# Patient Record
Sex: FEMALE | Race: WHITE | NOT HISPANIC OR LATINO
[De-identification: names, ages, dates, MRNs, and addresses within clinical notes are randomized per-mention and may not be internally consistent; named-entity substitution may affect disease eponyms.]

---

## 2019-01-29 ENCOUNTER — RECORD ABSTRACTING (OUTPATIENT)
Age: 69
End: 2019-01-29

## 2019-01-29 DIAGNOSIS — M54.9 DORSALGIA, UNSPECIFIED: ICD-10-CM

## 2019-01-29 DIAGNOSIS — Z80.0 FAMILY HISTORY OF MALIGNANT NEOPLASM OF DIGESTIVE ORGANS: ICD-10-CM

## 2019-02-05 ENCOUNTER — APPOINTMENT (OUTPATIENT)
Dept: GASTROENTEROLOGY | Facility: CLINIC | Age: 69
End: 2019-02-05
Payer: MEDICARE

## 2019-02-05 VITALS
WEIGHT: 138 LBS | BODY MASS INDEX: 26.06 KG/M2 | DIASTOLIC BLOOD PRESSURE: 81 MMHG | HEIGHT: 61 IN | SYSTOLIC BLOOD PRESSURE: 138 MMHG

## 2019-02-05 DIAGNOSIS — R13.10 DYSPHAGIA, UNSPECIFIED: ICD-10-CM

## 2019-02-05 PROCEDURE — 99214 OFFICE O/P EST MOD 30 MIN: CPT

## 2019-02-05 RX ORDER — MULTIVIT-MIN/IRON/FOLIC ACID/K 18-600-40
500 CAPSULE ORAL
Refills: 0 | Status: DISCONTINUED | COMMUNITY
End: 2019-02-05

## 2019-02-05 RX ORDER — MULTIVITAMIN
CAPSULE ORAL
Refills: 0 | Status: DISCONTINUED | COMMUNITY
End: 2019-02-05

## 2019-02-05 NOTE — HISTORY OF PRESENT ILLNESS
[FreeTextEntry1] : LPR; patient referred for GI evaluation..by Dr Saldivar, who has been evaluating and treating the patient for pharyngeal symptoms suspected of being Laryngo Pharyngeal Reflux  [Silent Reflux]\par \par the symptoms are as follows;\par for the last five or more years, pharyngeal discomfort, not related to any particular food, but it can occur with exercise\par she had a stress test and cardiology evaluation with dr Driver...who had performed a stress test perhaps a year ago.\par \par I checked the dates; and she had a stress test in late 2016.\par \par the throat discomfort is sometimes in reports described as burning, but the patient does not describe it that way.\par \par the pain is almost always if not always in assoc with exercise, predictably when she plays tennis.\par \par i\par

## 2019-02-05 NOTE — ASSESSMENT
[FreeTextEntry1] : 1.  patient has recurrent pharyngeal symptoms, and on my history, they are related generally to exertion, such as playing tennis, and they are relieved with rest.\par 2.  dr Saldivar has suspected silent reflux, and has prescribed ranitidine in the past, and then pantoprazole, neither of which have particularly helped\par 3.  My concern is the exertional component, and for that reason, I have just called and spoken to Dr Driver, and patient will be seen and evaluated for another work up for coronary disease.\par 4.  I would hold off on the EGD for now...patient sometimes has some difficulty swallowing certain foods out of her throat..but this seems to be different from the pain...\par 5.  therefore, after cardiac work up is completed, we can most definitely proceed to endoscopy as the next step

## 2019-02-13 ENCOUNTER — APPOINTMENT (OUTPATIENT)
Dept: CARDIOLOGY | Facility: CLINIC | Age: 69
End: 2019-02-13
Payer: MEDICARE

## 2019-02-13 VITALS
HEART RATE: 68 BPM | WEIGHT: 131 LBS | HEIGHT: 61 IN | BODY MASS INDEX: 24.73 KG/M2 | SYSTOLIC BLOOD PRESSURE: 120 MMHG | DIASTOLIC BLOOD PRESSURE: 70 MMHG

## 2019-02-13 DIAGNOSIS — M17.0 BILATERAL PRIMARY OSTEOARTHRITIS OF KNEE: ICD-10-CM

## 2019-02-13 PROCEDURE — 93015 CV STRESS TEST SUPVJ I&R: CPT

## 2019-02-13 PROCEDURE — 99213 OFFICE O/P EST LOW 20 MIN: CPT | Mod: 25

## 2019-02-13 NOTE — HISTORY OF PRESENT ILLNESS
[FreeTextEntry1] : I have evaluated the patient previously with similar symptoms which did not appear to be of cardiac origin she did well on previous stress test.

## 2019-02-13 NOTE — DISCUSSION/SUMMARY
[FreeTextEntry1] : The patient comes for evaluation of her perceived a symptom of shortness of breath on exertion. She was able to engage in regular exercise without difficulty. I had previously evaluated this a number of years ago and had no evidence of a cardiac etiology. I performed a treadmill stress test today revealing no evidence of exercise-induced myocardial ischemia or arrhythmias and fairly good exercise capacity. I reviewed the patient's labs. The salient findings are slightly elevated TSH and the patient has had a borderline glycated hemoglobin in the past. I advised her to avoid concentrated sweets refined carbohydrates et cetera. Evidently her diet could use some improvement. With regard to the shortness of breath I again do not feel this is of a cardiac etiology. I asked her to follow up with her primary care physician. Possibly some form of mild exertional asthma may be present.

## 2019-02-13 NOTE — REASON FOR VISIT
[FreeTextEntry1] : Patient complains of symptoms of shortness of breath on exertion. This is a perceived symptom. She was able to exercise vigorously with no particular difficulty. There has been no anginal discomfort.

## 2019-02-13 NOTE — PHYSICAL EXAM
[General Appearance - Well Developed] : well developed [Normal Appearance] : normal appearance [Well Groomed] : well groomed [General Appearance - Well Nourished] : well nourished [No Deformities] : no deformities [General Appearance - In No Acute Distress] : no acute distress [Normal Conjunctiva] : the conjunctiva exhibited no abnormalities [Eyelids - No Xanthelasma] : the eyelids demonstrated no xanthelasmas [Normal Oral Mucosa] : normal oral mucosa [No Oral Pallor] : no oral pallor [No Oral Cyanosis] : no oral cyanosis [Normal Jugular Venous A Waves Present] : normal jugular venous A waves present [Normal Jugular Venous V Waves Present] : normal jugular venous V waves present [No Jugular Venous Fournier A Waves] : no jugular venous fournier A waves [Respiration, Rhythm And Depth] : normal respiratory rhythm and effort [Exaggerated Use Of Accessory Muscles For Inspiration] : no accessory muscle use [Auscultation Breath Sounds / Voice Sounds] : lungs were clear to auscultation bilaterally [Heart Rate And Rhythm] : heart rate and rhythm were normal [Heart Sounds] : normal S1 and S2 [Murmurs] : no murmurs present [Abdomen Soft] : soft [Abdomen Tenderness] : non-tender [Abdomen Mass (___ Cm)] : no abdominal mass palpated [Abnormal Walk] : normal gait [Gait - Sufficient For Exercise Testing] : the gait was sufficient for exercise testing [Nail Clubbing] : no clubbing of the fingernails [Cyanosis, Localized] : no localized cyanosis [Petechial Hemorrhages (___cm)] : no petechial hemorrhages [Skin Color & Pigmentation] : normal skin color and pigmentation [] : no rash [No Venous Stasis] : no venous stasis [Skin Lesions] : no skin lesions [No Skin Ulcers] : no skin ulcer [No Xanthoma] : no  xanthoma was observed [Oriented To Time, Place, And Person] : oriented to person, place, and time [Affect] : the affect was normal [Mood] : the mood was normal [No Anxiety] : not feeling anxious [FreeTextEntry1] : The patient is experiencing some symptoms of dyspnea on exertion.

## 2019-07-31 ENCOUNTER — RESULT REVIEW (OUTPATIENT)
Age: 69
End: 2019-07-31

## 2019-08-01 ENCOUNTER — APPOINTMENT (OUTPATIENT)
Dept: GASTROENTEROLOGY | Facility: HOSPITAL | Age: 69
End: 2019-08-01
Payer: MEDICARE

## 2019-08-01 PROCEDURE — 43239 EGD BIOPSY SINGLE/MULTIPLE: CPT

## 2019-08-19 ENCOUNTER — APPOINTMENT (OUTPATIENT)
Dept: CARDIOLOGY | Facility: CLINIC | Age: 69
End: 2019-08-19
Payer: MEDICARE

## 2019-08-19 VITALS
WEIGHT: 126 LBS | BODY MASS INDEX: 24.74 KG/M2 | HEART RATE: 65 BPM | SYSTOLIC BLOOD PRESSURE: 115 MMHG | DIASTOLIC BLOOD PRESSURE: 70 MMHG | HEIGHT: 60 IN

## 2019-08-19 PROCEDURE — 93351 STRESS TTE COMPLETE: CPT

## 2019-08-19 PROCEDURE — 99213 OFFICE O/P EST LOW 20 MIN: CPT

## 2019-08-19 RX ORDER — PANTOPRAZOLE 40 MG/1
40 TABLET, DELAYED RELEASE ORAL
Refills: 0 | Status: DISCONTINUED | COMMUNITY
End: 2019-08-19

## 2019-08-19 NOTE — REASON FOR VISIT
[FreeTextEntry1] : The patient returns today for followup and stress echocardiography. I received a call from her gastroenterologist Dr. Shane Coppola who recently performed an upper endoscopy which was normal. He became somewhat more concerned that the patient's symptoms might be of a cardiac nature. The patient has had a long syndrome over many years of discomfort at the base of the neck accompanied by shortness of breath especially when she is exerting for example playing strenuous tennis. The patient has always been very active and continued to play with the splints in spite of these symptoms. She will use rest and just continue with the game and had no ill effects. I had previously evaluated with plain treadmill stress testing revealing good exercise capacity and no evidence of myocardial ischemia.

## 2019-08-19 NOTE — PHYSICAL EXAM
[General Appearance - Well Developed] : well developed [Normal Appearance] : normal appearance [Well Groomed] : well groomed [General Appearance - Well Nourished] : well nourished [No Deformities] : no deformities [General Appearance - In No Acute Distress] : no acute distress [Normal Conjunctiva] : the conjunctiva exhibited no abnormalities [Eyelids - No Xanthelasma] : the eyelids demonstrated no xanthelasmas [Normal Oral Mucosa] : normal oral mucosa [No Oral Pallor] : no oral pallor [No Oral Cyanosis] : no oral cyanosis [Normal Jugular Venous A Waves Present] : normal jugular venous A waves present [Normal Jugular Venous V Waves Present] : normal jugular venous V waves present [No Jugular Venous Fournier A Waves] : no jugular venous fournier A waves [Respiration, Rhythm And Depth] : normal respiratory rhythm and effort [Exaggerated Use Of Accessory Muscles For Inspiration] : no accessory muscle use [Heart Rate And Rhythm] : heart rate and rhythm were normal [Auscultation Breath Sounds / Voice Sounds] : lungs were clear to auscultation bilaterally [Heart Sounds] : normal S1 and S2 [Murmurs] : no murmurs present [Abdomen Soft] : soft [Abdomen Tenderness] : non-tender [Abdomen Mass (___ Cm)] : no abdominal mass palpated [Abnormal Walk] : normal gait [Gait - Sufficient For Exercise Testing] : the gait was sufficient for exercise testing [Nail Clubbing] : no clubbing of the fingernails [Cyanosis, Localized] : no localized cyanosis [Petechial Hemorrhages (___cm)] : no petechial hemorrhages [Skin Color & Pigmentation] : normal skin color and pigmentation [] : no rash [No Venous Stasis] : no venous stasis [Skin Lesions] : no skin lesions [No Xanthoma] : no  xanthoma was observed [No Skin Ulcers] : no skin ulcer [Oriented To Time, Place, And Person] : oriented to person, place, and time [Mood] : the mood was normal [Affect] : the affect was normal [No Anxiety] : not feeling anxious

## 2019-08-19 NOTE — DISCUSSION/SUMMARY
[FreeTextEntry1] : The patient's clinical examination today was stable. I performed stress echocardiography to evaluate her symptoms. The study revealed no evidence of exercise-induced myocardial ischemia. There were no EKG abnormalities during exercise and the echo images were normal at rest and bed rest post exercise. The test was somewhat abbreviated because of severe osteoarthritis of the right knee. Based on today's evaluation I was quite secure and assuring the patient that there is no evidence of exercise-induced myocardial ischemia. The cause of her symptoms remain somewhat of an enigma. I am recommending that she continue her program of exercise at this time. I do not recommend any additional cardiac tests.

## 2020-02-18 ENCOUNTER — APPOINTMENT (OUTPATIENT)
Dept: CARDIOLOGY | Facility: CLINIC | Age: 70
End: 2020-02-18

## 2021-03-19 ENCOUNTER — APPOINTMENT (OUTPATIENT)
Dept: GASTROENTEROLOGY | Facility: CLINIC | Age: 71
End: 2021-03-19
Payer: MEDICARE

## 2021-03-19 DIAGNOSIS — K21.9 GASTRO-ESOPHAGEAL REFLUX DISEASE W/OUT ESOPHAGITIS: ICD-10-CM

## 2021-03-19 PROCEDURE — 99443: CPT | Mod: 95

## 2021-03-20 PROBLEM — K21.9 LARYNGOPHARYNGEAL REFLUX (LPR): Status: ACTIVE | Noted: 2019-02-05

## 2021-03-20 NOTE — ASSESSMENT
[FreeTextEntry1] : 1.  patient has exertional chest pain, actually in her neck..but completely exertional, limiting her activities\par \par 2.  father had Myocardial infarction\par at a young age\par  at age [age] from his second Myocardial infarction\par 3. patient has one sister...\par \par \par patient will be seeing dr Driver\par I am reaching out to dr Driver as well\par \par no gi eval for this pain is needed currently\par \par colonoscopy;  had a colonoscopy three years ago, her mother  from colon can\par \par More than 50% of the face to face time was devoted to counseling and /or coordination of care.  THis coordination of care may have included reviewing other medical notes and reports, and communicating with other health professionals\par \par \par

## 2021-03-20 NOTE — HISTORY OF PRESENT ILLNESS
[FreeTextEntry1] : telephonic visit consent on file patient at home\par i am at home\par \par the patient continues to have chest and throat complaints, virtually continually since she underwent upper gi endoscopy and stress testing some two years earlier///neg egd, neg bx, neg sin with stress,by dr Driver\par \par the symptoms are more in the throat..\par they seem to emanate both from\par \par positional, when the patient bends over, she experiences these sx\par \par but also, with exertion\par it would occur with tennis, but she would try to play for fifteen minutes, and when she stopped after a couple minutes it would resolve\par \par if she kept playing, she could not, but the pain was signif, and the shortness of breath was distinct and different from usual\par \par it also occurs with other types of exercise;  cant tolerate exercise, \par used to be a big walker; three or four times per week, to raise her heart rate...same symptoms occurs\par \par it is not related to consuming esophageal irritants.\par \par never takes tums\par \par has a visit with dr Driver on april 6th..\par \par walks up stairs, gives her throat pains..and sob..

## 2021-04-06 ENCOUNTER — APPOINTMENT (OUTPATIENT)
Dept: CARDIOLOGY | Facility: CLINIC | Age: 71
End: 2021-04-06
Payer: MEDICARE

## 2021-04-06 VITALS
HEIGHT: 60 IN | SYSTOLIC BLOOD PRESSURE: 118 MMHG | BODY MASS INDEX: 25.72 KG/M2 | DIASTOLIC BLOOD PRESSURE: 75 MMHG | TEMPERATURE: 98.6 F | HEART RATE: 92 BPM | WEIGHT: 131 LBS

## 2021-04-06 PROCEDURE — 99214 OFFICE O/P EST MOD 30 MIN: CPT | Mod: 25

## 2021-04-06 PROCEDURE — 93015 CV STRESS TEST SUPVJ I&R: CPT

## 2021-04-06 NOTE — DISCUSSION/SUMMARY
[FreeTextEntry1] : Patient did well on today's stress test in fact she exhibited better than average exercise capacity easily going into Angel stage III without difficulty. On further reviewing the patient's symptoms I am convinced that her symptoms are due to laryngeal spasm generally this causing the symptom at the base of her throat as well as the difficulty breathing accompanied by stridor. She recently consulted an ENT physician who diagnosed laryngeal esophageal reflux this could be the inciting cause of her laryngospasm.\par he patient had a CAT scan of the abdomen done 2019 which did reveal aortic calcification. The correct treatment for this is atorvastatin. The patient is currently receiving 30 mg of atorvastatin daily. The patient also has strong family history of coronary artery disease and requires preventive measures. However I don't believe the patient's current symptoms are coronary or cardiac in nature.\par The patient had a lipid profile was in the last 2-3 weeks and will obtain the results for me. \par I am recommending strongly that the patient begin a program of regular exercise and also to adopt a healthy natural diet mostly planned based. \par Because of the finding of abdominal aortic calcification I am ordering an abdominal aortic ultrasound.\par CC: MARIBEL MANNING

## 2021-04-06 NOTE — PHYSICAL EXAM
[General Appearance - Well Developed] : well developed [Normal Appearance] : normal appearance [Well Groomed] : well groomed [General Appearance - Well Nourished] : well nourished [No Deformities] : no deformities [General Appearance - In No Acute Distress] : no acute distress [Normal Conjunctiva] : the conjunctiva exhibited no abnormalities [Eyelids - No Xanthelasma] : the eyelids demonstrated no xanthelasmas [Normal Oral Mucosa] : normal oral mucosa [No Oral Pallor] : no oral pallor [No Oral Cyanosis] : no oral cyanosis [Normal Jugular Venous A Waves Present] : normal jugular venous A waves present [Normal Jugular Venous V Waves Present] : normal jugular venous V waves present [No Jugular Venous Fournier A Waves] : no jugular venous fournier A waves [Respiration, Rhythm And Depth] : normal respiratory rhythm and effort [Exaggerated Use Of Accessory Muscles For Inspiration] : no accessory muscle use [Auscultation Breath Sounds / Voice Sounds] : lungs were clear to auscultation bilaterally [Heart Rate And Rhythm] : heart rate and rhythm were normal [Heart Sounds] : normal S1 and S2 [Murmurs] : no murmurs present [Abdomen Soft] : soft [Abdomen Tenderness] : non-tender [Abdomen Mass (___ Cm)] : no abdominal mass palpated [Abnormal Walk] : normal gait [Gait - Sufficient For Exercise Testing] : the gait was sufficient for exercise testing [Nail Clubbing] : no clubbing of the fingernails [Cyanosis, Localized] : no localized cyanosis [Petechial Hemorrhages (___cm)] : no petechial hemorrhages [Skin Color & Pigmentation] : normal skin color and pigmentation [] : no rash [No Venous Stasis] : no venous stasis [Skin Lesions] : no skin lesions [No Skin Ulcers] : no skin ulcer [No Xanthoma] : no  xanthoma was observed [Oriented To Time, Place, And Person] : oriented to person, place, and time [Affect] : the affect was normal [Mood] : the mood was normal [No Anxiety] : not feeling anxious

## 2021-04-06 NOTE — REASON FOR VISIT
[FreeTextEntry1] : Patient comes to the office today for followup and stress testing to further evaluate the certain clinical symptoms that she has experienced on a chronic basis.he symptom consists of a heart discomfort at the base of her throat which can occur with even very minor exertion and is accompanied by difficulty breathing there is often an accompanying stridor.Because of the symptom the patient has avoided regular exercise.

## 2021-04-14 ENCOUNTER — RESULT REVIEW (OUTPATIENT)
Age: 71
End: 2021-04-14

## 2021-05-03 ENCOUNTER — RESULT REVIEW (OUTPATIENT)
Age: 71
End: 2021-05-03

## 2021-05-03 ENCOUNTER — NON-APPOINTMENT (OUTPATIENT)
Age: 71
End: 2021-05-03

## 2021-05-03 RX ORDER — NITROGLYCERIN 0.4 MG/1
0.4 TABLET SUBLINGUAL
Qty: 25 | Refills: 5 | Status: ACTIVE | COMMUNITY
Start: 2021-05-03 | End: 1900-01-01

## 2021-05-14 ENCOUNTER — NON-APPOINTMENT (OUTPATIENT)
Age: 71
End: 2021-05-14

## 2021-05-14 ENCOUNTER — APPOINTMENT (OUTPATIENT)
Dept: CARDIOLOGY | Facility: CLINIC | Age: 71
End: 2021-05-14
Payer: MEDICARE

## 2021-05-14 VITALS
DIASTOLIC BLOOD PRESSURE: 64 MMHG | HEIGHT: 60 IN | WEIGHT: 129 LBS | SYSTOLIC BLOOD PRESSURE: 116 MMHG | HEART RATE: 82 BPM | TEMPERATURE: 96.6 F | BODY MASS INDEX: 25.32 KG/M2 | OXYGEN SATURATION: 98 %

## 2021-05-14 DIAGNOSIS — F32.9 MAJOR DEPRESSIVE DISORDER, SINGLE EPISODE, UNSPECIFIED: ICD-10-CM

## 2021-05-14 PROCEDURE — 99213 OFFICE O/P EST LOW 20 MIN: CPT

## 2021-05-14 PROCEDURE — 93000 ELECTROCARDIOGRAM COMPLETE: CPT

## 2021-05-14 RX ORDER — METOPROLOL TARTRATE 100 MG/1
100 TABLET, FILM COATED ORAL
Qty: 2 | Refills: 0 | Status: COMPLETED | COMMUNITY
Start: 2021-04-08 | End: 2021-05-14

## 2021-05-14 NOTE — REASON FOR VISIT
[Coronary Artery Disease] : coronary artery disease [FreeTextEntry1] : Ms. Solano called the office with report of two brief episodes of throat pain and shortness of breath on exertion this week. \par \par S/p KENDRA x 2 of LAD on 5/7/21 with residual 50% RCA. \par \par She reports that on Wednesday, she was working around the house, changing sheets and cleaning cabinets and she experienced throat pain and shortness of breath that lasted ~ 1 minute and resolved spontaneously, no other symptoms. Yesterday, on Thursday she was reaching for an object in her car trunk and she felt throat pain and shortness of breath similar with the previous episode. This resolved spontaneously. \par \par She reports no new episodes. She denies chest pain, SOB, palpitations, dizziness or syncope.

## 2021-05-14 NOTE — CARDIOLOGY SUMMARY
[de-identified] : 5/14/21 Sinus rhythm. Low voltage precordial leads. Old inferior infarct. Poor R wave progression. Consider old anterior infarct.  [de-identified] : 5/4/21 Calcium score 19. Severe stenosis in the proximal to mid RCA. Nonobstructive disease in the rest of the cardiac segments.  [de-identified] : 5/7/21 2v CAD, LAD 75% mid stenosis, iFR 0.88. LCx mild luminal irregularities. RCA 50% prox stenosis, iFR 0.99. \par LVEF 65%. \par No AS, no MR

## 2021-05-14 NOTE — REVIEW OF SYSTEMS
[Fever] : no fever [Headache] : no headache [Weight Gain (___ Lbs)] : no recent weight gain [Chills] : no chills [Feeling Fatigued] : not feeling fatigued [Weight Loss (___ Lbs)] : no recent weight loss [Dyspnea on exertion] : dyspnea during exertion [Chest Discomfort] : chest discomfort [Lower Ext Edema] : no extremity edema [Leg Claudication] : no intermittent leg claudication [Palpitations] : no palpitations [Orthopnea] : no orthopnea [PND] : no PND [Syncope] : no syncope [Cough] : no cough [Rash] : no rash [Dizziness] : no dizziness [Confusion] : no confusion was observed [Easy Bleeding] : no tendency for easy bleeding [Easy Bruising] : no tendency for easy bruising [FreeTextEntry5] : 2 brief episodes of throat pain and SOB on strenuous exertion

## 2021-05-14 NOTE — PHYSICAL EXAM
[No Acute Distress] : no acute distress [Normal S1, S2] : normal S1, S2 [No Murmur] : no murmur [Clear Lung Fields] : clear lung fields [Good Air Entry] : good air entry [No Respiratory Distress] : no respiratory distress  [Normal Gait] : normal gait [No Edema] : no edema [Normal Radial B/L] : normal radial B/L [Normal DP B/L] : normal DP B/L [No Rash] : no rash [Moves all extremities] : moves all extremities [No Focal Deficits] : no focal deficits [Normal Speech] : normal speech [Alert and Oriented] : alert and oriented [Normal memory] : normal memory [de-identified] : right wrist cath site dry and intact

## 2021-05-14 NOTE — HISTORY OF PRESENT ILLNESS
[FreeTextEntry1] : 70 year old female with hypertension, dyslipidemia, DM type 2, acid reflux, depression, CAD s/p KENDRA x 2 to LAD on 5/7/21.

## 2021-05-18 ENCOUNTER — NON-APPOINTMENT (OUTPATIENT)
Age: 71
End: 2021-05-18

## 2021-05-24 ENCOUNTER — APPOINTMENT (OUTPATIENT)
Dept: CARDIOLOGY | Facility: CLINIC | Age: 71
End: 2021-05-24
Payer: MEDICARE

## 2021-05-24 ENCOUNTER — NON-APPOINTMENT (OUTPATIENT)
Age: 71
End: 2021-05-24

## 2021-05-24 VITALS
TEMPERATURE: 97.9 F | BODY MASS INDEX: 23.03 KG/M2 | WEIGHT: 122 LBS | DIASTOLIC BLOOD PRESSURE: 60 MMHG | HEIGHT: 61 IN | HEART RATE: 74 BPM | SYSTOLIC BLOOD PRESSURE: 110 MMHG

## 2021-05-24 PROCEDURE — 99214 OFFICE O/P EST MOD 30 MIN: CPT

## 2021-05-24 PROCEDURE — 93000 ELECTROCARDIOGRAM COMPLETE: CPT

## 2021-05-27 NOTE — REASON FOR VISIT
[FreeTextEntry1] : Patient underwent coronary angiography on May 7 and was in [facility]. The test was ordered because of persistent symptoms of exertional chest pain and shortness of breath. However previous stress test revealed no evidence of exercise-induced myocardial ischemia. The patient had undergone a CT angiogram of the coronary arteries prior to the test which seemed to indicate predominately non-calcified plaque in the right coronary artery which was felt to be significant. However at the time of the coronary angiogram this was not confirmed but rather a lesion was detected in the LAD which was FFR positive and therefore a drug-eluting stent to that lesion was placed. The patient had immediate improvement after the procedure however in the past few days she has had recurrent symptoms of shortness of breath and some discomfort in the left upper chest. Currently her condition appears to be stable. The patient's medications were increased and now include clopidogrel 75 mg daily. She had already been prescribed baby aspirin 81 mg and all her other medications were continued. The patient developed a significant muscle aches and I recommended that she briefly stopped the atorvastatin this resulted in likely improvement. However I am recommending that she resume atorvastatin out of dosage of 20 mg.

## 2021-05-27 NOTE — DISCUSSION/SUMMARY
[FreeTextEntry1] : The patient's exam at the present time appears stable. Her oxygen saturation was 98% on room air blood pressure 110/60 cardiorespiratory examination normal; site of the catheteization a the right wrist is normal the electrocardiogram is normal. I am now recommending that the patient gradually get back into mild exercise and I'm planning to enroll her in the [place] cardiac rehabilitation program after a modified treadmill test.

## 2021-06-01 ENCOUNTER — APPOINTMENT (OUTPATIENT)
Dept: CARDIOLOGY | Facility: CLINIC | Age: 71
End: 2021-06-01
Payer: MEDICARE

## 2021-06-01 VITALS
DIASTOLIC BLOOD PRESSURE: 80 MMHG | BODY MASS INDEX: 22.47 KG/M2 | TEMPERATURE: 98.6 F | HEART RATE: 72 BPM | SYSTOLIC BLOOD PRESSURE: 118 MMHG | WEIGHT: 119 LBS | HEIGHT: 61 IN

## 2021-06-01 DIAGNOSIS — E78.00 PURE HYPERCHOLESTEROLEMIA, UNSPECIFIED: ICD-10-CM

## 2021-06-01 PROCEDURE — 93015 CV STRESS TEST SUPVJ I&R: CPT

## 2021-06-01 PROCEDURE — 99212 OFFICE O/P EST SF 10 MIN: CPT | Mod: 25

## 2021-06-01 RX ORDER — METFORMIN ER 500 MG 500 MG/1
500 TABLET ORAL
Qty: 60 | Refills: 0 | Status: DISCONTINUED | COMMUNITY
Start: 2021-05-15 | End: 2021-06-01

## 2021-06-01 RX ORDER — PANTOPRAZOLE 40 MG/1
40 TABLET, DELAYED RELEASE ORAL DAILY
Qty: 30 | Refills: 2 | Status: DISCONTINUED | COMMUNITY
Start: 2021-05-14 | End: 2021-06-01

## 2021-06-01 RX ORDER — FLUOXETINE HYDROCHLORIDE 10 MG/1
10 CAPSULE ORAL
Qty: 42 | Refills: 0 | Status: DISCONTINUED | COMMUNITY
Start: 2020-11-30 | End: 2021-06-01

## 2021-06-01 RX ORDER — ATORVASTATIN CALCIUM 40 MG/1
40 TABLET, FILM COATED ORAL
Qty: 90 | Refills: 3 | Status: DISCONTINUED | COMMUNITY
End: 2021-06-01

## 2021-06-01 RX ORDER — TRAZODONE HYDROCHLORIDE 50 MG/1
50 TABLET ORAL
Qty: 30 | Refills: 0 | Status: DISCONTINUED | COMMUNITY
Start: 2021-01-12 | End: 2021-06-01

## 2021-06-01 NOTE — DISCUSSION/SUMMARY
[FreeTextEntry1] : The patient did well on today's treadmill test. There was no evidence of exercise-induced myocardial ischemia and the patient exhibited good exercise capacity easily exercising into Angel III.\par There was no evidence of exercise-induced chest or throat discomfort.\par \par The patient's cardiac status to be stable. Current medication including dual antiplatelet therapy will be continued. The patient is a good candidate for the cardiac rehabilitation program at [place] and this is going to be arranged. Walking exercises also advised.\par The patient's original symptoms remain enigmatic. I am hopeful that she will obtain durable relief at least to some degree after the coronary stent procedure.

## 2021-06-01 NOTE — REASON FOR VISIT
[FreeTextEntry1] : The patient returns to the office today for followup and stress testing. She continues to experience occasional symptoms of exertional chest discomfort. However there was a marked improvement at least for a while following the stent placement. The discomfort is in the base of the neck and the "throat area".

## 2021-09-18 NOTE — REASON FOR VISIT
[Consultation] : a consultation visit [FreeTextEntry1] : LPR, ie Reflux Laryngitis, or 'silent reflux' [place]

## 2021-10-12 ENCOUNTER — APPOINTMENT (OUTPATIENT)
Dept: CARDIOLOGY | Facility: CLINIC | Age: 71
End: 2021-10-12
Payer: MEDICARE

## 2021-10-12 VITALS
DIASTOLIC BLOOD PRESSURE: 60 MMHG | SYSTOLIC BLOOD PRESSURE: 92 MMHG | WEIGHT: 103.8 LBS | HEIGHT: 61 IN | BODY MASS INDEX: 19.6 KG/M2

## 2021-10-12 DIAGNOSIS — R07.9 CHEST PAIN, UNSPECIFIED: ICD-10-CM

## 2021-10-12 PROCEDURE — 93000 ELECTROCARDIOGRAM COMPLETE: CPT

## 2021-10-12 PROCEDURE — 99213 OFFICE O/P EST LOW 20 MIN: CPT

## 2021-10-12 NOTE — DISCUSSION/SUMMARY
[FreeTextEntry1] : The patient completed a full course of cardiac rehabilitation where she did extremely well. There were no cardiac symptoms during any of these sessions. There were no symptoms of chest pain or shortness of breath or palpitations. The patient does have a chronic of peculiar syndrome of shortness of breath and discomfort at the base of the throat when beginning even minor exertion at home at some times.\par Patient has lost a great deal of weight over the past 2 years around 35 pounds. With this her blood sugars have normalized her glycated hemoglobin was 5.3. Her repeat blood pressure today was 98/62 this probably is the result of the weight reduction. Patient does not complain of dizziness or lightheadedness. \par His son today's examination I believe the patient's cardiovascular status is stable. Her current medications will be continued. Metformin has already been tapered by her primary to 500 mg once per day. I have asked the patient to notify me if there are symptoms of dizziness or lightheadedness in which case we will continue metoprolol completely. Believe the patient's weight is now optimal and she should not attempt to shed any more weight.

## 2021-10-12 NOTE — REASON FOR VISIT
[FreeTextEntry1] : The patient is followed with a diagnosis of CAD. She underwent coronary angiography at [facility] in May 7, 2021. The study revealed a 75% stenosis of the mid LAD and this was treated with 2 drug-eluting stents to the mid LAD with an excellent angiographic result. All of this procedure the patient exercised in the [place] cardiac rehabilitation for a total of 36 sessions where she did extremely well with no clinical symptoms.\par However the patient continues to have acutely or syndrome of shortness of breath and discomfort at the base of the throat often when she begins even minor exertion at home. The etiology of those symptoms remains unclear.

## 2022-07-07 ENCOUNTER — RX RENEWAL (OUTPATIENT)
Age: 72
End: 2022-07-07

## 2022-07-07 RX ORDER — ASPIRIN ENTERIC COATED TABLETS 81 MG 81 MG/1
81 TABLET, DELAYED RELEASE ORAL
Qty: 90 | Refills: 3 | Status: ACTIVE | COMMUNITY
Start: 2021-05-14 | End: 1900-01-01

## 2022-07-25 ENCOUNTER — RX RENEWAL (OUTPATIENT)
Age: 72
End: 2022-07-25

## 2022-10-05 ENCOUNTER — APPOINTMENT (OUTPATIENT)
Dept: GASTROENTEROLOGY | Facility: CLINIC | Age: 72
End: 2022-10-05

## 2022-10-05 VITALS
TEMPERATURE: 97.6 F | DIASTOLIC BLOOD PRESSURE: 62 MMHG | BODY MASS INDEX: 22.66 KG/M2 | HEIGHT: 61 IN | SYSTOLIC BLOOD PRESSURE: 98 MMHG | WEIGHT: 120 LBS

## 2022-10-05 RX ORDER — FAMOTIDINE 20 MG/1
20 TABLET, FILM COATED ORAL
Refills: 0 | Status: DISCONTINUED | COMMUNITY
Start: 2021-05-14 | End: 2022-10-05

## 2022-10-05 RX ORDER — METOPROLOL SUCCINATE 25 MG/1
25 TABLET, EXTENDED RELEASE ORAL
Qty: 45 | Refills: 3 | Status: ACTIVE | COMMUNITY
Start: 2021-05-14

## 2022-10-05 RX ORDER — ROSUVASTATIN CALCIUM 40 MG/1
40 TABLET, FILM COATED ORAL DAILY
Refills: 0 | Status: ACTIVE | COMMUNITY

## 2022-10-05 RX ORDER — ALBUTEROL SULFATE 90 UG/1
108 AEROSOL, METERED RESPIRATORY (INHALATION)
Refills: 0 | Status: ACTIVE | COMMUNITY

## 2022-11-28 ENCOUNTER — APPOINTMENT (OUTPATIENT)
Dept: CARDIOLOGY | Facility: CLINIC | Age: 72
End: 2022-11-28

## 2022-11-28 ENCOUNTER — NON-APPOINTMENT (OUTPATIENT)
Age: 72
End: 2022-11-28

## 2022-11-28 VITALS
RESPIRATION RATE: 18 BRPM | HEIGHT: 61 IN | DIASTOLIC BLOOD PRESSURE: 76 MMHG | TEMPERATURE: 96.9 F | OXYGEN SATURATION: 98 % | WEIGHT: 125.44 LBS | SYSTOLIC BLOOD PRESSURE: 130 MMHG | BODY MASS INDEX: 23.68 KG/M2 | HEART RATE: 78 BPM

## 2022-11-28 DIAGNOSIS — E11.9 TYPE 2 DIABETES MELLITUS W/OUT COMPLICATIONS: ICD-10-CM

## 2022-11-28 PROCEDURE — 93000 ELECTROCARDIOGRAM COMPLETE: CPT

## 2022-11-28 PROCEDURE — 99214 OFFICE O/P EST MOD 30 MIN: CPT

## 2022-11-28 RX ORDER — ISOSORBIDE DINITRATE 5 MG/1
TABLET ORAL
Refills: 0 | Status: DISCONTINUED | COMMUNITY
End: 2022-11-28

## 2022-11-28 RX ORDER — METFORMIN HYDROCHLORIDE 500 MG/1
500 TABLET, COATED ORAL TWICE DAILY
Refills: 0 | Status: DISCONTINUED | COMMUNITY
Start: 2021-05-14 | End: 2022-11-28

## 2022-11-28 RX ORDER — AZITHROMYCIN 250 MG/1
250 TABLET, FILM COATED ORAL
Qty: 6 | Refills: 0 | Status: DISCONTINUED | COMMUNITY
Start: 2022-11-21 | End: 2022-11-28

## 2022-11-28 RX ORDER — ATORVASTATIN CALCIUM 20 MG/1
20 TABLET, FILM COATED ORAL
Refills: 0 | Status: DISCONTINUED | COMMUNITY
Start: 2020-09-23 | End: 2022-11-28

## 2022-11-28 RX ORDER — VENLAFAXINE HYDROCHLORIDE 150 MG/1
150 CAPSULE, EXTENDED RELEASE ORAL
Qty: 90 | Refills: 0 | Status: ACTIVE | COMMUNITY
Start: 2022-11-17

## 2022-11-28 RX ORDER — FAMOTIDINE 40 MG/1
40 TABLET, FILM COATED ORAL
Refills: 0 | Status: DISCONTINUED | COMMUNITY
Start: 2021-04-22 | End: 2022-11-28

## 2022-11-28 RX ORDER — EZETIMIBE 10 MG/1
10 TABLET ORAL
Qty: 30 | Refills: 0 | Status: ACTIVE | COMMUNITY
Start: 2022-08-09

## 2022-11-28 NOTE — DISCUSSION/SUMMARY
[FreeTextEntry1] : Patient returns after a full year.  She is doing much better than previous visits.  There have been no acute cardiac symptoms.  Cardiorespiratory examination is normal electrocardiograph is normal\par \par At this point I felt it would be appropriate to try to taper off Isordil.

## 2022-11-28 NOTE — REASON FOR VISIT
[FreeTextEntry1] : Patient returns for follow-up.  In early 2021 she was followed with a diagnosis of recurrent chest discomfort which ultimately led to coronary angiography.  This study revealed a 75% stenosis in the mid LAD and was treated with 2 drug-eluting stents to the mid LAD\par \par The patient improved somewhat after the procedure however I always felt that the chest discomfort was atypical and in fact she was later diagnosed with mild COPD and placed on a bronchodilator.  The patient had been a smoker until quitting in 2004.\par \par Patient's clinical condition is markedly improved from previous visits.  She is feeling well and does not complain of any active symptoms of chest pain or unusual shortness of breath.  She is getting regular walking exercise without exertional symptoms.

## 2022-11-30 ENCOUNTER — APPOINTMENT (OUTPATIENT)
Dept: GASTROENTEROLOGY | Facility: CLINIC | Age: 72
End: 2022-11-30

## 2022-11-30 VITALS
TEMPERATURE: 97.4 F | DIASTOLIC BLOOD PRESSURE: 68 MMHG | HEART RATE: 83 BPM | WEIGHT: 125 LBS | HEIGHT: 61 IN | BODY MASS INDEX: 23.6 KG/M2 | SYSTOLIC BLOOD PRESSURE: 110 MMHG

## 2022-11-30 DIAGNOSIS — Z95.5 PRESENCE OF CORONARY ANGIOPLASTY IMPLANT AND GRAFT: ICD-10-CM

## 2022-11-30 DIAGNOSIS — Z80.0 FAMILY HISTORY OF MALIGNANT NEOPLASM OF DIGESTIVE ORGANS: ICD-10-CM

## 2022-11-30 DIAGNOSIS — Z12.11 ENCOUNTER FOR SCREENING FOR MALIGNANT NEOPLASM OF COLON: ICD-10-CM

## 2022-11-30 PROCEDURE — 99214 OFFICE O/P EST MOD 30 MIN: CPT

## 2022-11-30 RX ORDER — VENLAFAXINE HYDROCHLORIDE 150 MG/1
150 CAPSULE, EXTENDED RELEASE ORAL
Refills: 0 | Status: ACTIVE | COMMUNITY
Start: 2021-05-14

## 2022-11-30 NOTE — HISTORY OF PRESENT ILLNESS
[FreeTextEntry1] : referred here for colonoscopy by dr Lory Koch, ossining [facility], for colonoscopy...\par indication is as follows;\par \par mother had colon cancer, aged 88\par no rectal bleeding, no irregularity\par \par all chest pain or sob resolved with further cardiac eval and ultimately two coronary stents, and some nitrates\par does not get exertional chest pain\par some atypical symptoms, but dr Driver saw her this week, feels she is well now, and actually plans to start tapering the isosorbide dinitrate.which she takes orally\par \par patient passed her last stress test within the year\par \par

## 2022-11-30 NOTE — ASSESSMENT
[FreeTextEntry1] : 1.  proceed with colonoscopy as it is five years since last colon, july 27th, and i reviewed the report\par \par 2.  patient has the fh of colon ca as we mentioned\par \par 3.  no bleeding per rectum, etc\par \par 4.  stable cad,  lad artery, two stents, sawl dr Driver her Cardiologist on nov 28th 2022\par and he is tapering her slowly off nitrates\par \par plan\par time for next colonoscopy\par \par AS WE OBTAIN INFORMED CONSENT FOR COLONOSCOPY, UPPER ENDOSCOPY [EGD], OR BOTH PROCEDURES TOGETHER;\par \par As with all procedures, there are risks of which the patient should be aware\par \par 1.  Anesthesia; deep sedation with Propofol;  there is a small risk of aspiration and pulmonary infection.  The Anesthesiologist meets with the patient the morning of the procedure to discuss in more detail\par \par 2.  risk of bleeding; from removal of a polyp, or rarely, from biopsies, 1 % or less\par \par 3.  risk of injury or perforation of the colon or upper GI tract; one in a thousand or less,  from removing a polyp or from advancing the instrument\par \par \par More than 50% of the face to face time was devoted to counseling and /or coordination of care.  THis coordination of care may have included reviewing other medical notes and reports, and communicating with other health professionals\par \par does not need to hold baby asprin\par \par no need for egd;  since stenting the patient no longer has any symptoms suggestive of reflux, and it appears her major symptomatic diagnoses related to coronary disease, and probably some mild underlying copd,  no smoking for eighteen years

## 2023-02-14 ENCOUNTER — RESULT REVIEW (OUTPATIENT)
Age: 73
End: 2023-02-14

## 2023-02-16 ENCOUNTER — RESULT REVIEW (OUTPATIENT)
Age: 73
End: 2023-02-16

## 2023-02-17 ENCOUNTER — APPOINTMENT (OUTPATIENT)
Dept: GASTROENTEROLOGY | Facility: HOSPITAL | Age: 73
End: 2023-02-17

## 2023-02-17 ENCOUNTER — TRANSCRIPTION ENCOUNTER (OUTPATIENT)
Age: 73
End: 2023-02-17

## 2023-03-31 ENCOUNTER — NON-APPOINTMENT (OUTPATIENT)
Age: 73
End: 2023-03-31

## 2023-04-26 ENCOUNTER — APPOINTMENT (OUTPATIENT)
Dept: CARDIOLOGY | Facility: CLINIC | Age: 73
End: 2023-04-26
Payer: MEDICARE

## 2023-04-26 VITALS
BODY MASS INDEX: 23.6 KG/M2 | HEIGHT: 61 IN | WEIGHT: 125 LBS | HEART RATE: 74 BPM | DIASTOLIC BLOOD PRESSURE: 78 MMHG | SYSTOLIC BLOOD PRESSURE: 122 MMHG

## 2023-04-26 DIAGNOSIS — I25.10 ATHEROSCLEROTIC HEART DISEASE OF NATIVE CORONARY ARTERY W/OUT ANGINA PECTORIS: ICD-10-CM

## 2023-04-26 DIAGNOSIS — R06.02 SHORTNESS OF BREATH: ICD-10-CM

## 2023-04-26 DIAGNOSIS — R07.89 OTHER CHEST PAIN: ICD-10-CM

## 2023-04-26 PROCEDURE — 93015 CV STRESS TEST SUPVJ I&R: CPT

## 2023-04-26 PROCEDURE — 99214 OFFICE O/P EST MOD 30 MIN: CPT | Mod: 25

## 2023-04-26 RX ORDER — UMECLIDINIUM BROMIDE AND VILANTEROL TRIFENATATE 62.5; 25 UG/1; UG/1
62.5-25 POWDER RESPIRATORY (INHALATION)
Refills: 0 | Status: DISCONTINUED | COMMUNITY
End: 2023-04-26

## 2023-04-26 RX ORDER — FLUTICASONE FUROATE AND VILANTEROL TRIFENATATE 200; 25 UG/1; UG/1
200-25 POWDER RESPIRATORY (INHALATION)
Refills: 0 | Status: ACTIVE | COMMUNITY

## 2023-04-26 RX ORDER — UMECLIDINIUM 62.5 UG/1
62.5 AEROSOL, POWDER ORAL
Refills: 0 | Status: ACTIVE | COMMUNITY

## 2023-04-26 RX ORDER — ISOSORBIDE MONONITRATE 30 MG/1
30 TABLET, EXTENDED RELEASE ORAL DAILY
Qty: 45 | Refills: 3 | Status: DISCONTINUED | COMMUNITY
Start: 2022-10-17 | End: 2023-04-26

## 2023-04-26 NOTE — REVIEW OF SYSTEMS
[Weight Gain (___ Lbs)] : no recent weight gain [Weight Loss (___ Lbs)] : no recent weight loss [Dyspnea on exertion] : dyspnea during exertion [Chest Discomfort] : chest discomfort [Lower Ext Edema] : no extremity edema [Palpitations] : no palpitations [Cough] : no cough [Syncope] : no syncope [Dizziness] : no dizziness [Easy Bleeding] : no tendency for easy bleeding [Easy Bruising] : no tendency for easy bruising

## 2023-04-26 NOTE — PHYSICAL EXAM
[Clear Lung Fields] : clear lung fields [Good Air Entry] : good air entry [No Respiratory Distress] : no respiratory distress  [Normal Gait] : normal gait [No Edema] : no edema [Normal] : alert and oriented, normal memory

## 2023-04-26 NOTE — CARDIOLOGY SUMMARY
[de-identified] : 4/26/23 6:43 Angel 9.2 mets 80% mphr no ekg changes, chest discomfort (nuclear ordered)

## 2023-04-26 NOTE — REASON FOR VISIT
[FreeTextEntry1] : Complains of shortness of breath\par Comes for stress test today \par \par Notes progressing RAMIREZ, she is walking less and playing less tennis/pickle due to symptoms\par Sometimes notes chest discomfort or a feeling in her throat\par She cannot accurately assess when dyspnea started to progress\par States also her inhaler was changed few weeks ago\par Sees pulmonary for COPD

## 2023-05-16 ENCOUNTER — RESULT REVIEW (OUTPATIENT)
Age: 73
End: 2023-05-16

## 2023-05-16 ENCOUNTER — NON-APPOINTMENT (OUTPATIENT)
Age: 73
End: 2023-05-16

## 2023-06-30 ENCOUNTER — RX RENEWAL (OUTPATIENT)
Age: 73
End: 2023-06-30

## 2023-06-30 RX ORDER — ASPIRIN 81 MG/1
81 TABLET, COATED ORAL
Qty: 90 | Refills: 3 | Status: ACTIVE | COMMUNITY
Start: 2023-06-30 | End: 1900-01-01

## 2023-11-29 ENCOUNTER — APPOINTMENT (OUTPATIENT)
Dept: CARDIOLOGY | Facility: CLINIC | Age: 73
End: 2023-11-29

## 2024-08-29 ENCOUNTER — RX RENEWAL (OUTPATIENT)
Age: 74
End: 2024-08-29

## 2024-11-25 ENCOUNTER — RX RENEWAL (OUTPATIENT)
Age: 74
End: 2024-11-25

## 2025-04-09 ENCOUNTER — RX RENEWAL (OUTPATIENT)
Age: 75
End: 2025-04-09

## 2025-05-07 ENCOUNTER — RX RENEWAL (OUTPATIENT)
Age: 75
End: 2025-05-07